# Patient Record
Sex: FEMALE | Race: WHITE | NOT HISPANIC OR LATINO | URBAN - METROPOLITAN AREA
[De-identification: names, ages, dates, MRNs, and addresses within clinical notes are randomized per-mention and may not be internally consistent; named-entity substitution may affect disease eponyms.]

---

## 2017-01-23 ENCOUNTER — APPOINTMENT (RX ONLY)
Dept: URBAN - METROPOLITAN AREA CLINIC 39 | Facility: CLINIC | Age: 32
Setting detail: DERMATOLOGY
End: 2017-01-23

## 2017-01-23 DIAGNOSIS — L91.0 HYPERTROPHIC SCAR: ICD-10-CM

## 2017-01-23 DIAGNOSIS — D22 MELANOCYTIC NEVI: ICD-10-CM

## 2017-01-23 PROBLEM — D22.5 MELANOCYTIC NEVI OF TRUNK: Status: ACTIVE | Noted: 2017-01-23

## 2017-01-23 PROCEDURE — ? OTHER

## 2017-01-23 PROCEDURE — ? TREATMENT REGIMEN

## 2017-01-23 PROCEDURE — 99213 OFFICE O/P EST LOW 20 MIN: CPT

## 2017-01-23 PROCEDURE — ? COUNSELING

## 2017-01-23 ASSESSMENT — LOCATION SIMPLE DESCRIPTION DERM: LOCATION SIMPLE: LEFT UPPER BACK

## 2017-01-23 ASSESSMENT — LOCATION DETAILED DESCRIPTION DERM: LOCATION DETAILED: LEFT MID-UPPER BACK

## 2017-01-23 ASSESSMENT — LOCATION ZONE DERM: LOCATION ZONE: TRUNK

## 2017-01-23 NOTE — PROCEDURE: OTHER
Detail Level: Zone
Note Text (......Xxx Chief Complaint.): This diagnosis correlates with the
Other (Free Text): Biopsy obtained 7/2016 was consistent with benign melanocytic nevus with congential features